# Patient Record
Sex: FEMALE | Race: WHITE | URBAN - METROPOLITAN AREA
[De-identification: names, ages, dates, MRNs, and addresses within clinical notes are randomized per-mention and may not be internally consistent; named-entity substitution may affect disease eponyms.]

---

## 2020-06-22 ENCOUNTER — HOSPITAL ENCOUNTER (EMERGENCY)
Facility: HOSPITAL | Age: 25
Discharge: HOME | End: 2020-06-22
Attending: EMERGENCY MEDICINE
Payer: OTHER GOVERNMENT

## 2020-06-22 VITALS
SYSTOLIC BLOOD PRESSURE: 118 MMHG | DIASTOLIC BLOOD PRESSURE: 82 MMHG | OXYGEN SATURATION: 100 % | HEIGHT: 63 IN | WEIGHT: 146 LBS | TEMPERATURE: 98 F | RESPIRATION RATE: 16 BRPM | HEART RATE: 90 BPM | BODY MASS INDEX: 25.87 KG/M2

## 2020-06-22 DIAGNOSIS — O20.0 THREATENED ABORTION: Primary | ICD-10-CM

## 2020-06-22 LAB
B-HCG UR QL: POSITIVE
CTP QC/QA: YES
SARS-COV-2 RDRP RESP QL NAA+PROBE: NEGATIVE

## 2020-06-22 PROCEDURE — 99284 EMERGENCY DEPT VISIT MOD MDM: CPT | Mod: ,,, | Performed by: EMERGENCY MEDICINE

## 2020-06-22 PROCEDURE — 99284 PR EMERGENCY DEPT VISIT,LEVEL IV: ICD-10-PCS | Mod: ,,, | Performed by: EMERGENCY MEDICINE

## 2020-06-22 PROCEDURE — 25000003 PHARM REV CODE 250: Performed by: EMERGENCY MEDICINE

## 2020-06-22 PROCEDURE — 81025 URINE PREGNANCY TEST: CPT | Performed by: EMERGENCY MEDICINE

## 2020-06-22 PROCEDURE — U0002 COVID-19 LAB TEST NON-CDC: HCPCS

## 2020-06-22 PROCEDURE — 99283 EMERGENCY DEPT VISIT LOW MDM: CPT | Mod: 25

## 2020-06-22 RX ORDER — ONDANSETRON 8 MG/1
8 TABLET, ORALLY DISINTEGRATING ORAL
Status: COMPLETED | OUTPATIENT
Start: 2020-06-22 | End: 2020-06-22

## 2020-06-22 RX ADMIN — ONDANSETRON 8 MG: 8 TABLET, ORALLY DISINTEGRATING ORAL at 12:06

## 2020-06-22 NOTE — ED PROVIDER NOTES
Encounter Date: 6/22/2020       History     Chief Complaint   Patient presents with    Abdominal Pain     diarrhea, vomiting i think i'm pregnant     23 yo f, c/o n/v/d, x 1 day, moderate, preceded by constipation last week.  Pt concerned bc had positive preg test 4 days ago, LMP 6 weeks ago.  Had VB but has since resolved.  + abd cramping.      The history is provided by the patient.     Review of patient's allergies indicates:  No Known Allergies  Past Medical History:   Diagnosis Date    Chlamydia     GC (gonococcus infection)      History reviewed. No pertinent surgical history.  Family History   Problem Relation Age of Onset    Miscarriages / Stillbirths Maternal Grandmother     Miscarriages / Stillbirths Mother      Social History     Tobacco Use    Smoking status: Never Smoker   Substance Use Topics    Alcohol use: Not on file    Drug use: Not on file     Review of Systems   Constitutional: Negative for chills and fever.   Gastrointestinal: Positive for abdominal pain, constipation, diarrhea, nausea and vomiting. Negative for abdominal distention.   Genitourinary: Negative for dysuria, vaginal bleeding and vaginal discharge.       Physical Exam     Initial Vitals [06/22/20 1130]   BP Pulse Resp Temp SpO2   112/72 89 18 97.9 °F (36.6 °C) 100 %      MAP       --         Physical Exam    Nursing note and vitals reviewed.  Constitutional: Vital signs are normal. She appears well-developed and well-nourished.  Non-toxic appearance. She does not appear ill.   HENT:   Head: Normocephalic and atraumatic.   Mouth/Throat: Oropharynx is clear and moist and mucous membranes are normal. Mucous membranes are not dry.   Eyes: Conjunctivae and lids are normal.   Neck: Normal range of motion. Neck supple.   Cardiovascular: Normal rate.   Pulmonary/Chest: No respiratory distress.   Abdominal: Soft. She exhibits no distension. There is no abdominal tenderness. There is no rebound and no guarding.   Musculoskeletal: No  edema.   Neurological: She is alert.   Skin: Skin is warm, dry and intact. No pallor.   Psychiatric: She has a normal mood and affect. Her speech is normal.         ED Course   Procedures  Labs Reviewed   POCT URINE PREGNANCY - Abnormal; Notable for the following components:       Result Value    POC Preg Test, Ur Positive (*)     All other components within normal limits   SARS-COV-2 RNA AMPLIFICATION, QUAL          Imaging Results    None          Medical Decision Making:   History:   Old Medical Records: I decided to obtain old medical records.  Initial Assessment:   25 yo f, healthy, here with n/v/d abd pain, + pregnancy test    VSS and well-appearing  Differential Diagnosis:   R/o ectopic vs early pregnancy with AGE  abd exam completely benign so low suspicion for surgical process  Clinical Tests:   Lab Tests: Ordered and Reviewed  ED Management:  Bedside sono                   ED Course as of 944   Mon 2020   1315 Bedside sono with + IUP, yolk sac, FHT    Pt would like to f/u in [place] with OB.      [AS]   1324 COVID negative  Will d/c    [AS]      ED Course User Index  [AS] Taylor Clemente MD                Clinical Impression:       ICD-10-CM ICD-9-CM   1. Threatened   O20.0 640.00             ED Disposition Condition    Discharge Stable        ED Prescriptions     None        Follow-up Information     Follow up With Specialties Details Why Contact Info    Your OB doctor                                         Taylor Clemente MD  [id number]

## 2020-06-22 NOTE — ED TRIAGE NOTES
Martina Gale, a 24 y.o. female presents to the ED w/ complaint of vomiting.  Patient reports one episode of diarrhea due to stomach pain.  Patient reports she thinks she may be pregnant.    Triage note:  Chief Complaint   Patient presents with    Abdominal Pain     diarrhea, vomiting i think i'm pregnant     Review of patient's allergies indicates:  No Known Allergies  Past Medical History:   Diagnosis Date    Chlamydia     GC (gonococcus infection)      LOC: Patient name and date of birth verified. The patient is awake, alert and aware of environment with an appropriate affect, the patient is oriented x 3 and speaking appropriately.   APPEARANCE: Patient resting comfortably, patient is clean and well groomed, patient's clothing is properly fastened.  MUSCULOSKELETAL: Patient moving all extremities well, no obvious swelling or deformities noted.   RESPIRATORY: Respirations are spontaneous, patient has a normal effort and rate, no accessory muscle use noted.  CARDIAC: Patient has a normal rate

## 2020-06-22 NOTE — ED NOTES
Per Dr. Clemente the patient to be placed in intake 2 for bedside pelvic ultrasound to confirm pregnancy.  Intake nurses made aware